# Patient Record
Sex: MALE | Race: WHITE | Employment: FULL TIME | URBAN - METROPOLITAN AREA
[De-identification: names, ages, dates, MRNs, and addresses within clinical notes are randomized per-mention and may not be internally consistent; named-entity substitution may affect disease eponyms.]

---

## 2017-02-22 DIAGNOSIS — T63.441A BEE STING-INDUCED ANAPHYLAXIS: Primary | ICD-10-CM

## 2017-02-22 DIAGNOSIS — T78.2XXA BEE STING-INDUCED ANAPHYLAXIS: Primary | ICD-10-CM

## 2017-02-22 NOTE — TELEPHONE ENCOUNTER
Epipen 2 Ian      Last Written Prescription Date: 6/17/15  Last Fill Quantity: 2,  # refills: 1   Last Office Visit with G, P or [facility] prescribing provider: 2/1/16

## 2017-02-23 RX ORDER — EPINEPHRINE 0.3 MG/.3ML
0.3 INJECTION SUBCUTANEOUS
Qty: 0.3 ML | Refills: 1 | Status: SHIPPED | OUTPATIENT
Start: 2017-02-23 | End: 2020-05-14

## 2019-05-14 ENCOUNTER — HOSPITAL ENCOUNTER (EMERGENCY)
Facility: CLINIC | Age: 39
Discharge: HOME | End: 2019-05-14
Attending: PHYSICIAN ASSISTANT | Admitting: PHYSICIAN ASSISTANT
Payer: COMMERCIAL

## 2019-05-14 VITALS
SYSTOLIC BLOOD PRESSURE: 123 MMHG | TEMPERATURE: 98.1 F | HEIGHT: 71 IN | WEIGHT: 207 LBS | RESPIRATION RATE: 16 BRPM | DIASTOLIC BLOOD PRESSURE: 76 MMHG | BODY MASS INDEX: 28.98 KG/M2 | OXYGEN SATURATION: 96 %

## 2019-05-14 DIAGNOSIS — J06.9 URI WITH COUGH AND CONGESTION: ICD-10-CM

## 2019-05-14 DIAGNOSIS — Z87.891 HISTORY OF TOBACCO USE: ICD-10-CM

## 2019-05-14 PROCEDURE — G0463 HOSPITAL OUTPT CLINIC VISIT: HCPCS | Performed by: PHYSICIAN ASSISTANT

## 2019-05-14 PROCEDURE — 99214 OFFICE O/P EST MOD 30 MIN: CPT | Mod: Z6 | Performed by: PHYSICIAN ASSISTANT

## 2019-05-14 RX ORDER — AZITHROMYCIN 250 MG/1
TABLET, FILM COATED ORAL
Qty: 6 TABLET | Refills: 0 | Status: SHIPPED | OUTPATIENT
Start: 2019-05-14 | End: 2019-10-30

## 2019-05-14 RX ORDER — FLUTICASONE PROPIONATE 50 MCG
1 SPRAY, SUSPENSION (ML) NASAL DAILY
Qty: 16 G | Refills: 0 | Status: SHIPPED | OUTPATIENT
Start: 2019-05-14

## 2019-05-14 RX ORDER — ALBUTEROL SULFATE 90 UG/1
1-2 AEROSOL, METERED RESPIRATORY (INHALATION) EVERY 4 HOURS PRN
Qty: 18 G | Refills: 0 | Status: SHIPPED | OUTPATIENT
Start: 2019-05-14

## 2019-05-14 ASSESSMENT — ENCOUNTER SYMPTOMS
LIGHT-HEADEDNESS: 1
SORE THROAT: 1
DIARRHEA: 0
RHINORRHEA: 1
TROUBLE SWALLOWING: 0
VOICE CHANGE: 0
ABDOMINAL PAIN: 0
WEAKNESS: 1
PALPITATIONS: 0
VOMITING: 0
WHEEZING: 1
FEVER: 0
NAUSEA: 0
COUGH: 1
HEADACHES: 0

## 2019-05-14 ASSESSMENT — MIFFLIN-ST. JEOR: SCORE: 1881.08

## 2019-05-14 NOTE — ED PROVIDER NOTES
History     Chief Complaint   Patient presents with     URI     HPI  Benjamin Spedevick is a 38 year old male who presents with     ENT Symptoms             Symptoms: cc Present Absent Comment   Fever/Chills   x    Fatigue  x  Slightly weak and light headedness on and off    Muscle Aches   x    Eye Irritation   x    Sneezing   x    Nasal Juanjose/Drg  x     Sinus Pressure/Pain   x    Loss of smell   x    Dental pain   x    Sore Throat  x  Hoarse voice on and off    Swollen Glands   x    Ear Pain/Fullness   x    Cough x   Productive and green    Wheeze  x  Occasionally    Chest Pain   x    Shortness of breath   x    Rash   x    Other    x Headache, abdominal pain     Symptom duration:  7-8 days    Symptom severity:  mild and improving some, but cough persisting.    Treatments tried:  over the counter cough medications and mucinex   Contacts:  none known.      Patient states he had several year history of tobacco use, but quit in September. Patient denies COPD, emphysema, cardiac issues, asthma, or diabetes.     Problem list, Medication list, Allergies, and Medical/Social/Surgical histories reviewed in EPIC and updated as appropriate.      Allergies:  Allergies   Allergen Reactions     Bees Anaphylaxis       Problem List:    Patient Active Problem List    Diagnosis Date Noted     Tobacco abuse 06/26/2012     Priority: Medium     CARDIOVASCULAR SCREENING; LDL GOAL LESS THAN 160 10/31/2010     Priority: Medium     Bee sting-induced anaphylaxis 04/13/2010     Priority: Medium     Dental abscess 04/13/2010     Priority: Medium     Other acne 12/21/2007     Priority: Medium     Dermatophytosis of body 08/04/2005     Priority: Medium     Problem list name updated by automated process. Provider to review          Past Medical History:    No past medical history on file.    Past Surgical History:    No past surgical history on file.    Family History:    Family History   Problem Relation Age of Onset     Alcohol/Drug Father   "      Social History:  Marital Status:   [2]  Social History     Tobacco Use     Smoking status: Former Smoker     Packs/day: 0.30     Years: 0.50     Pack years: 0.15     Types: Cigarettes     Last attempt to quit: 2008     Years since quittin.3     Smokeless tobacco: Never Used     Tobacco comment: chewed for about 5 years, age [age]   Substance Use Topics     Alcohol use: Yes     Comment: beer social     Drug use: No     Comment: meth abuse age [age], cocaine, marijuana        Medications:      albuterol (PROAIR HFA/PROVENTIL HFA/VENTOLIN HFA) 108 (90 Base) MCG/ACT inhaler   azithromycin (ZITHROMAX Z-ZULEYMA) 250 MG tablet   fluticasone (FLONASE) 50 MCG/ACT nasal spray   cyclobenzaprine (FLEXERIL) 10 MG tablet   EPINEPHrine (EPIPEN 2-ZULEYMA) 0.3 MG/0.3ML injection   EPINEPHrine (EPIPEN) 0.3 MG/0.3ML injection   EPINEPHrine (EPIPEN) 0.3 MG/0.3ML injection   naproxen (NAPROSYN) 500 MG tablet   zolpidem (AMBIEN) 5 MG tablet         Review of Systems   Constitutional: Negative for fever.   HENT: Positive for congestion, postnasal drip, rhinorrhea and sore throat. Negative for ear discharge, ear pain, trouble swallowing and voice change.    Respiratory: Positive for cough and wheezing.    Cardiovascular: Negative for chest pain and palpitations.   Gastrointestinal: Negative for abdominal pain, diarrhea, nausea and vomiting.   Neurological: Positive for weakness and light-headedness. Negative for headaches.   All other systems reviewed and are negative.      Physical Exam   BP: 123/76  Heart Rate: 74  Temp: 98.1  F (36.7  C)  Resp: 16  Height: 180.3 cm (5' 11\")  Weight: 93.9 kg (207 lb)  SpO2: 96 %      Physical Exam     Constitutional: healthy, alert, no distress, cooperative and smiling   Cardiovascular: RRR. No murmurs, clicks gallops or rub  Respiratory: Lungs clear to auscultation bilaterally. No rales, rhonchi, wheezing appreciated. No accessory muscle use or retractions.   Neck: Neck supple. No " adenopathy. Thyroid symmetric, normal size,  ENT: bilateral TM normal without fluid or infection, neck has bilateral anterior cervical nodes enlarged, throat normal without erythema or exudate, sinuses nontender, post nasal drip noted and nasal mucosa congested  Abdomen: Abdomen soft, non-tender. BS normal. No masses, organomegaly  NEURO: Gait normal. Muscle strength normal bilaterally throughout. Sensation grossly WNL. GCS 15  SKIN: no suspicious lesions or rashes    No results found for this or any previous visit (from the past 24 hour(s)).        ED Course        Procedures              Critical Care time:  none               No results found for this or any previous visit (from the past 24 hour(s)).    Medications - No data to display    Assessments & Plan (with Medical Decision Making)     I have reviewed the nursing notes.    I have reviewed the findings, diagnosis, plan and need for follow up with the patient.   38-year-old male presents the urgent care with 1 week history of URI type symptoms.  Patient states symptoms seem to be improving some, but the cough is persisting and is concerned it may be turning into pneumonia.  See exam findings above.  No indication for chest x-ray at this time, but discussed obtaining one in office today if patient was concerned.  Patient agrees to hold off in office today.  Patient requesting antibiotic prescription as a wait-and-see if symptoms persist or change in any way at least he has a develop.  I informed patient that at this time symptoms appear to be viral in origin and no antibiotic indicated.  Discussed risk of taking antibiotics when it is not necessary including resistance to these antibiotics in the future.  Patient is aware and will hold off on the antibiotic if he does not need it, but would still like the prescription.  Patient given prescription for albuterol inhaler and Flonase nasal spray to start today as needed for wheezing, persistent cough, shortness of  breath.  Flonase also will help with the postnasal drainage and cough.  Patient to follow-up with primary care doctor in 1 to 2 weeks for recheck.  Patient return sooner if symptoms worsen or change these were discussed with patient and given on discharge paperwork.  No concerns at this time for cardiac or PE concerns. Patient discharged in stable condition.       Medication List      Started    albuterol 108 (90 Base) MCG/ACT inhaler  Commonly known as:  PROAIR HFA/PROVENTIL HFA/VENTOLIN HFA  1-2 puffs, Inhalation, EVERY 4 HOURS PRN     azithromycin 250 MG tablet  Commonly known as:  ZITHROMAX Z-ZULEYMA  Two tablets on the first day, then one tablet daily for the next 4 days     fluticasone 50 MCG/ACT nasal spray  Commonly known as:  FLONASE  1 spray, Both Nostrils, DAILY, For 1 -2 weeks then as needed.            Final diagnoses:   URI with cough and congestion   History of tobacco use - quit in September 5/14/2019   [facility] EMERGENCY DEPARTMENT    Deborah Bourgeois PA-C  05/14/19 [id number]

## 2019-05-14 NOTE — DISCHARGE INSTRUCTIONS
Use Medication as directed; fill flonase and use as directed.  Patient can use albuterol inhaler as needed if wheezing or shortness of breath occurs.  Patient given prescription Z-Ian as a wait-and-see.  Discussed with patient at this time symptoms appear to be viral in origin and no antibiotic indicated, but if symptoms persist, worsen, or change or fail to improve in the next 7 days patient can fill Z-Ian.    Patient advised to call for any test results (if obtained during visit) within 2-3 days.     Hydrate with fluids, rest, cool humidifier.  May use acetaminophen, ibuprofen prn.    For your Cough   The Buckwheat Honey Dose: Given   hour Prior to Bedtime  For children age under 1 year -Do not use due to botulism risk    2-5 years -  tsp (2.5 mL)   6-11 years -1 tsp (5 mL)   12-18 years -2 tsp (10 mL)     Guaifenesin    Adult dose -Not to exceed 2.4 g (2400mg) per day   Child age 6-12 years -100 mg every 4 hr, not to exceed 1.2 g (1200mg) per day    Pediatric, 2-6 years -50 mg every 4 hr as needed, not to exceed 600 mg per day    Cough medications is not recommended in children under 2 years.  With use of cough medications have combination medications be aware of products in the cough medication you are using to avoid overdose    Follow up with PCP in 1-2 weeks.    Go to Emergency Room if sx worsen or change, Shortness of breath, chest pain, persistent fevers, or painful breathing occur.     Patient voiced understanding of instructions given.

## 2019-05-14 NOTE — ED AVS SNAPSHOT
[facility] Emergency Department  [address] [address]  Phone:  [phone]  Fax:  [phone]                                    Benjamin Spedevick   MRN: [id number]    Department:  [facility] Emergency Department   Date of Visit:  5/14/2019           After Visit Summary Signature Page    I have received my discharge instructions, and my questions have been answered. I have discussed any challenges I see with this plan with the nurse or doctor.    ..........................................................................................................................................  Patient/Patient Representative Signature      ..........................................................................................................................................  Patient Representative Print Name and Relationship to Patient    ..................................................               ................................................  Date                                   Time    ..........................................................................................................................................  Reviewed by Signature/Title    ...................................................              ..............................................  Date                                               Time          22EPIC Rev 08/18

## 2019-10-30 ENCOUNTER — OFFICE VISIT (OUTPATIENT)
Dept: FAMILY MEDICINE | Facility: CLINIC | Age: 39
End: 2019-10-30
Payer: COMMERCIAL

## 2019-10-30 VITALS
HEART RATE: 70 BPM | BODY MASS INDEX: 28.17 KG/M2 | HEIGHT: 72 IN | DIASTOLIC BLOOD PRESSURE: 70 MMHG | WEIGHT: 208 LBS | SYSTOLIC BLOOD PRESSURE: 118 MMHG | TEMPERATURE: 97.5 F

## 2019-10-30 DIAGNOSIS — M54.59 MECHANICAL LOW BACK PAIN: ICD-10-CM

## 2019-10-30 DIAGNOSIS — Z00.00 ROUTINE GENERAL MEDICAL EXAMINATION AT A HEALTH CARE FACILITY: Primary | ICD-10-CM

## 2019-10-30 DIAGNOSIS — R40.0 HAS DAYTIME DROWSINESS: ICD-10-CM

## 2019-10-30 DIAGNOSIS — R53.83 OTHER FATIGUE: ICD-10-CM

## 2019-10-30 LAB
ANION GAP SERPL CALCULATED.3IONS-SCNC: 7 MMOL/L (ref 3–14)
BUN SERPL-MCNC: 17 MG/DL (ref 7–30)
CALCIUM SERPL-MCNC: 9 MG/DL (ref 8.5–10.1)
CHLORIDE SERPL-SCNC: 106 MMOL/L (ref 94–109)
CO2 SERPL-SCNC: 28 MMOL/L (ref 20–32)
CREAT SERPL-MCNC: 0.84 MG/DL (ref 0.66–1.25)
GFR SERPL CREATININE-BSD FRML MDRD: >90 ML/MIN/{1.73_M2}
GLUCOSE SERPL-MCNC: 86 MG/DL (ref 70–99)
POTASSIUM SERPL-SCNC: 3.7 MMOL/L (ref 3.4–5.3)
SODIUM SERPL-SCNC: 141 MMOL/L (ref 133–144)

## 2019-10-30 PROCEDURE — 84403 ASSAY OF TOTAL TESTOSTERONE: CPT | Performed by: NURSE PRACTITIONER

## 2019-10-30 PROCEDURE — 99213 OFFICE O/P EST LOW 20 MIN: CPT | Mod: 25 | Performed by: NURSE PRACTITIONER

## 2019-10-30 PROCEDURE — 84270 ASSAY OF SEX HORMONE GLOBUL: CPT | Performed by: NURSE PRACTITIONER

## 2019-10-30 PROCEDURE — 36415 COLL VENOUS BLD VENIPUNCTURE: CPT | Performed by: NURSE PRACTITIONER

## 2019-10-30 PROCEDURE — 90471 IMMUNIZATION ADMIN: CPT | Performed by: NURSE PRACTITIONER

## 2019-10-30 PROCEDURE — 90472 IMMUNIZATION ADMIN EACH ADD: CPT | Performed by: NURSE PRACTITIONER

## 2019-10-30 PROCEDURE — 80048 BASIC METABOLIC PNL TOTAL CA: CPT | Performed by: NURSE PRACTITIONER

## 2019-10-30 PROCEDURE — 99385 PREV VISIT NEW AGE 18-39: CPT | Mod: 25 | Performed by: NURSE PRACTITIONER

## 2019-10-30 PROCEDURE — 90632 HEPA VACCINE ADULT IM: CPT | Performed by: NURSE PRACTITIONER

## 2019-10-30 PROCEDURE — 90714 TD VACC NO PRESV 7 YRS+ IM: CPT | Performed by: NURSE PRACTITIONER

## 2019-10-30 RX ORDER — CYCLOBENZAPRINE HCL 10 MG
10 TABLET ORAL 3 TIMES DAILY PRN
Qty: 90 TABLET | Refills: 1 | Status: SHIPPED | OUTPATIENT
Start: 2019-10-30

## 2019-10-30 RX ORDER — NAPROXEN 500 MG/1
500 TABLET ORAL 2 TIMES DAILY WITH MEALS
Qty: 60 TABLET | Refills: 1 | Status: SHIPPED | OUTPATIENT
Start: 2019-10-30

## 2019-10-30 ASSESSMENT — ENCOUNTER SYMPTOMS
ABDOMINAL PAIN: 0
COUGH: 0
NERVOUS/ANXIOUS: 0
HEMATURIA: 0
DIZZINESS: 0
CHILLS: 0
CONSTIPATION: 0
DIARRHEA: 0
FEVER: 0
HEMATOCHEZIA: 0
EYE PAIN: 0

## 2019-10-30 ASSESSMENT — MIFFLIN-ST. JEOR: SCORE: 1901.48

## 2019-10-30 NOTE — LETTER
November 6, 2019      Umang Dickeydevick  [address] [address]        Dear Mr.Spedevick,    We are writing to inform you of your test results.    Your testosterone levels are within the normal limits.     Component      Latest Ref Rng & Units 10/30/2019   Testosterone Total      240 - 950 ng/dL 575   Sex Hormone Binding Globulin      11 - 80 nmol/L 50   Free Testosterone Calculated      4.7 - 24.4 ng/dL 9.39       If you have any questions or concerns, please call the clinic at the number listed above.       Sincerely,        KYLIE Parr CNP

## 2019-10-30 NOTE — PROGRESS NOTES
SUBJECTIVE:   CC: Benjamin Spedevick is an 38 year old male who presents for preventative health visit.     Healthy Habits:     Getting at least 3 servings of Calcium per day:  NO    Bi-annual eye exam:  NO    Dental care twice a year:  Yes    Sleep apnea or symptoms of sleep apnea:  Daytime drowsiness and Sleep apnea    Diet:  Regular (no restrictions)    Frequency of exercise:  1 day/week    Duration of exercise:  N/A    Taking medications regularly:  Yes    Medication side effects:  None    PHQ-2 Total Score: 0    Additional concerns today:  No          Today's PHQ-2 Score:   PHQ-2 (  [employer]) 10/30/2019   Q1: Little interest or pleasure in doing things 0   Q2: Feeling down, depressed or hopeless 0   PHQ-2 Score 0   Q1: Little interest or pleasure in doing things Not at all   Q2: Feeling down, depressed or hopeless Not at all   PHQ-2 Score 0       Abuse: Current or Past(Physical, Sexual or Emotional)- No  Do you feel safe in your environment? Yes        Social History     Tobacco Use     Smoking status: Former Smoker     Packs/day: 0.30     Years: 0.50     Pack years: 0.15     Types: Cigarettes     Last attempt to quit: 2008     Years since quittin.8     Smokeless tobacco: Never Used     Tobacco comment: chewed for about 5 years, age [age]   Substance Use Topics     Alcohol use: Yes     Comment: beer social     If you drink alcohol do you typically have >3 drinks per day or >7 drinks per week? Yes      Alcohol Use 10/30/2019   Prescreen: >3 drinks/day or >7 drinks/week? No       Last PSA: No results found for: PSA    Reviewed orders with patient. Reviewed health maintenance and updated orders accordingly - Yes  Labs reviewed in EPIC  BP Readings from Last 3 Encounters:   10/30/19 118/70   19 123/76   16 123/80    Wt Readings from Last 3 Encounters:   10/30/19 94.3 kg (208 lb)   19 93.9 kg (207 lb)   16 97.4 kg (214 lb 12.8 oz)                  Patient Active Problem List    Diagnosis     Dermatophytosis of body     Other acne     Bee sting-induced anaphylaxis     Dental abscess     CARDIOVASCULAR SCREENING; LDL GOAL LESS THAN 160     Tobacco abuse     No past surgical history on file.    Social History     Tobacco Use     Smoking status: Former Smoker     Packs/day: 0.30     Years: 0.50     Pack years: 0.15     Types: Cigarettes     Last attempt to quit: 2008     Years since quittin.8     Smokeless tobacco: Never Used     Tobacco comment: chewed for about 5 years, age [age]   Substance Use Topics     Alcohol use: Yes     Comment: beer social     Family History   Problem Relation Age of Onset     Alcohol/Drug Father          Current Outpatient Medications   Medication Sig Dispense Refill     albuterol (PROAIR HFA/PROVENTIL HFA/VENTOLIN HFA) 108 (90 Base) MCG/ACT inhaler Inhale 1-2 puffs into the lungs every 4 hours as needed for shortness of breath / dyspnea or wheezing 18 g 0     cyclobenzaprine (FLEXERIL) 10 MG tablet Take 1 tablet (10 mg) by mouth 3 times daily as needed for muscle spasms 90 tablet 3     EPINEPHrine (EPIPEN 2-ZULEYMA) 0.3 MG/0.3ML injection Inject 0.3 mLs (0.3 mg) into the muscle once as needed for anaphylaxis DUE FOR APPOINTMENT 2017. NO FURTHER REFILLS 0.3 mL 1     fluticasone (FLONASE) 50 MCG/ACT nasal spray Spray 1 spray into both nostrils daily For 1 -2 weeks then as needed. 16 g 0     naproxen (NAPROSYN) 500 MG tablet Take 1 tablet (500 mg) by mouth 2 times daily (with meals) 60 tablet 6     zolpidem (AMBIEN) 5 MG tablet Take 1 tablet (5 mg) by mouth nightly as needed for sleep 30 tablet 0     Allergies   Allergen Reactions     Bees Anaphylaxis     No lab results found.     Reviewed and updated as needed this visit by clinical staff         Reviewed and updated as needed this visit by Provider        No past medical history on file.   No past surgical history on file.  OB History   No obstetric history on file.       Review of Systems    Constitutional: Negative for chills and fever.   HENT: Negative for congestion and ear pain.    Eyes: Negative for pain.   Respiratory: Negative for cough.    Cardiovascular: Negative for chest pain.   Gastrointestinal: Negative for abdominal pain, constipation, diarrhea and hematochezia.   Genitourinary: Negative for hematuria.   Neurological: Negative for dizziness.   Psychiatric/Behavioral: The patient is not nervous/anxious.        OBJECTIVE:   /70   Pulse 70   Temp 97.5  F (36.4  C) (Tympanic)   Ht 1.829 m (6')   Wt 94.3 kg (208 lb)   BMI 28.21 kg/m      Physical Exam  GENERAL: healthy, alert and no distress  EYES: Eyes grossly normal to inspection, PERRL and conjunctivae and sclerae normal  HENT: ear canals and TM's normal, nose and mouth without ulcers or lesions  NECK: no adenopathy, no asymmetry, masses, or scars and thyroid normal to palpation  RESP: lungs clear to auscultation - no rales, rhonchi or wheezes  CV: regular rate and rhythm, normal S1 S2, no S3 or S4, no murmur, click or rub, no peripheral edema and peripheral pulses strong  ABDOMEN: soft, nontender, no hepatosplenomegaly, no masses and bowel sounds normal  MS: no gross musculoskeletal defects noted, no edema  SKIN: no suspicious lesions or rashes  NEURO: Normal strength and tone, mentation intact and speech normal  PSYCH: mentation appears normal, affect normal/bright        ASSESSMENT/PLAN:   (Z00.00) Routine general medical examination at a health care facility  (primary encounter diagnosis)  Comment:   Plan: Basic metabolic panel           (M54.5) Mechanical low back pain  Comment:l patient has chronic lower back pain is controlled with Flexeril renewed today  Plan: cyclobenzaprine (FLEXERIL) 10 MG tablet,         naproxen (NAPROSYN) 500 MG tablet, OFFICE/OUTPT        VISIT,EST,LEVL III    (R53.83) Other fatigue  Comment: Patient concerned about fatigue and testosterone level did obtain today  Plan: **Testosterone Free and  "Total FUTURE anytime,         OFFICE/OUTPT VISIT,EST,LEVL III      (R40.0) Has daytime drowsiness  Comment:Patient has noticed increased daytime drowsiness concern for undiagnosed sleep apnea versus narcolepsy we will have him perform sleep test  Plan: SLEEP EVALUATION & MANAGEMENT REFERRAL - ADULT         -[facility]  [phone]        (Age 2 and up), OFFICE/OUTPT VISIT,EST,LEVL III        COUNSELING:   Reviewed preventive health counseling, as reflected in patient instructions       Regular exercise       Healthy diet/nutrition       Vision screening       Hearing screening       Alcohol Use    Estimated body mass index is 28.87 kg/m  as calculated from the following:    Height as of 5/14/19: 1.803 m (5' 11\").    Weight as of 5/14/19: 93.9 kg (207 lb).          reports that he quit smoking about 11 years ago. His smoking use included cigarettes. He has a 0.15 pack-year smoking history. He has never used smokeless tobacco.      Counseling Resources:  ATP IV Guidelines  Pooled Cohorts Equation Calculator  FRAX Risk Assessment  ICSI Preventive Guidelines  Dietary Guidelines for Americans, 2010  USDA's MyPlate  ASA Prophylaxis  Lung CA Screening    KYLIE Parr CNP  [facility]  "

## 2019-10-30 NOTE — NURSING NOTE
Chief Complaint   Patient presents with     Physical       Initial /70   Pulse 70   Temp 97.5  F (36.4  C) (Tympanic)   Ht 1.829 m (6')   Wt 94.3 kg (208 lb)   BMI 28.21 kg/m   Estimated body mass index is 28.21 kg/m  as calculated from the following:    Height as of this encounter: 1.829 m (6').    Weight as of this encounter: 94.3 kg (208 lb).    Patient presents to the clinic using     Health Maintenance that is potentially due pending provider review:          Is there anyone who you would like to be able to receive your results? If yes have patient fill out EDIN

## 2019-10-31 DIAGNOSIS — F51.01 PRIMARY INSOMNIA: ICD-10-CM

## 2019-10-31 RX ORDER — ZOLPIDEM TARTRATE 5 MG/1
5 TABLET ORAL
Qty: 30 TABLET | Refills: 0 | Status: SHIPPED | OUTPATIENT
Start: 2019-10-31 | End: 2019-12-12

## 2019-10-31 NOTE — TELEPHONE ENCOUNTER
PHARMACY SAYS PATIENT WAS EXPECTING A NEW ORDER FOR AMBIEN TO BE SENT TO PHARMACY. PLEASE VERIFY AND RE-SEND    Requested Prescriptions   Pending Prescriptions Disp Refills     zolpidem (AMBIEN) 5 MG tablet 30 tablet 0     Sig: Take 1 tablet (5 mg) by mouth nightly as needed for sleep       There is no refill protocol information for this order            Last Written Prescription Date:  12/15/16  Last Fill Quantity: 30,   # refills: 0  Last Office Visit: YESTERDAY WITH LUCIEN SHELTON  Future Office visit:       Routing refill request to provider for review/approval because:  Drug not on the [facility], P or [facility] refill protocol or controlled substance

## 2019-11-04 NOTE — RESULT ENCOUNTER NOTE
Please Notify Umang  of test results basic metabolic panel within normal limits.  Testosterone level still pending     Negra Nielson CNP

## 2019-11-05 LAB
SHBG SERPL-SCNC: 50 NMOL/L (ref 11–80)
TESTOST FREE SERPL-MCNC: 9.39 NG/DL (ref 4.7–24.4)
TESTOST SERPL-MCNC: 575 NG/DL (ref 240–950)

## 2019-11-05 NOTE — RESULT ENCOUNTER NOTE
Please Notify Umang  of test results *testosterone level within normal limits.    Negra Nielson CNP

## 2019-12-11 DIAGNOSIS — F51.01 PRIMARY INSOMNIA: ICD-10-CM

## 2019-12-11 NOTE — TELEPHONE ENCOUNTER
Instructions         Return in about 1 year (around 10/30/2020) for Physical Exam.      Preventive Health Recommendations  Male Ages 26 - 39

## 2019-12-11 NOTE — TELEPHONE ENCOUNTER
Requested Prescriptions   Pending Prescriptions Disp Refills     zolpidem (AMBIEN) 5 MG tablet 30 tablet 0     Sig: Take 1 tablet (5 mg) by mouth nightly as needed for sleep       There is no refill protocol information for this order            Last Written Prescription Date:  10/31/19  Last Fill Quantity: 30,   # refills: 0  Last Office Visit: 10/30/19  Arthur  Future Office visit:       Routing refill request to provider for review/approval because:  Drug not on the [facility] or [facility] refill protocol or controlled substance

## 2019-12-12 RX ORDER — ZOLPIDEM TARTRATE 5 MG/1
5 TABLET ORAL
Qty: 30 TABLET | Refills: 0 | Status: SHIPPED | OUTPATIENT
Start: 2019-12-12 | End: 2020-01-13

## 2020-01-13 DIAGNOSIS — F51.01 PRIMARY INSOMNIA: ICD-10-CM

## 2020-01-13 RX ORDER — ZOLPIDEM TARTRATE 5 MG/1
5 TABLET ORAL
Qty: 30 TABLET | Refills: 0 | Status: SHIPPED | OUTPATIENT
Start: 2020-01-13 | End: 2020-03-09

## 2020-01-13 NOTE — TELEPHONE ENCOUNTER
Routing refill request to provider for review/approval because:  Drug not on the [facility] refill protocol      10/30/19 OV: 'Return in about 1 year (around 10/30/2020) for Physical Exam.'    Rebecca KELLY RN

## 2020-01-13 NOTE — TELEPHONE ENCOUNTER
Requested Prescriptions   Pending Prescriptions Disp Refills     zolpidem (AMBIEN) 5 MG tablet 30 tablet 0     Sig: Take 1 tablet (5 mg) by mouth nightly as needed for sleep       There is no refill protocol information for this order        Last Written Prescription Date:  12/12/19  Last Fill Quantity: 30,  # refills: 0   Last office visit: 10/30/2019 with prescribing provider:  Nisreen Santana Office Visit:     Ale Bowen

## 2020-03-06 DIAGNOSIS — F51.01 PRIMARY INSOMNIA: ICD-10-CM

## 2020-03-07 NOTE — TELEPHONE ENCOUNTER
ZOLPIDEM TARTRATE 5 MG TABLET  Last Written Prescription Date:  1/13/20  Last Fill Quantity: 30,  # refills: 0   Last office visit: 10/30/2019 with prescribing provider:  shawanda   Future Office Visit:    Requested Prescriptions   Pending Prescriptions Disp Refills     zolpidem (AMBIEN) 5 MG tablet [Pharmacy Med Name: ZOLPIDEM TARTRATE 5 MG TABLET] 30 tablet 0     Sig: TAKE 1 TABLET (5 MG) BY MOUTH NIGHTLY AS NEEDED FOR SLEEP       There is no refill protocol information for this order

## 2020-03-09 RX ORDER — ZOLPIDEM TARTRATE 5 MG/1
5 TABLET ORAL
Qty: 30 TABLET | Refills: 0 | Status: SHIPPED | OUTPATIENT
Start: 2020-03-09 | End: 2020-04-10

## 2020-04-09 DIAGNOSIS — F51.01 PRIMARY INSOMNIA: ICD-10-CM

## 2020-04-10 RX ORDER — ZOLPIDEM TARTRATE 5 MG/1
5 TABLET ORAL
Qty: 30 TABLET | Refills: 0 | Status: SHIPPED | OUTPATIENT
Start: 2020-04-10 | End: 2020-05-12

## 2020-04-10 NOTE — TELEPHONE ENCOUNTER
Routing refill request to provider for review/approval because:  Drug not on the [facility] refill protocol     Requested Prescriptions   Pending Prescriptions Disp Refills     zolpidem (AMBIEN) 5 MG tablet [Pharmacy Med Name: ZOLPIDEM TARTRATE 5 MG TABLET] 30 tablet 0     Sig: TAKE 1 TABLET (5 MG) BY MOUTH NIGHTLY AS NEEDED FOR SLEEP       There is no refill protocol information for this order        Last Written Prescription Date:  3/9/2020  Last Fill Quantity: 30,  # refills: 0   Last office visit: 10/30/2019 with prescribing provider:  Nisreen ESPINAL   Future Office Visit:      Rebecca KELLY RN, BSN

## 2020-05-09 DIAGNOSIS — F51.01 PRIMARY INSOMNIA: ICD-10-CM

## 2020-05-11 NOTE — TELEPHONE ENCOUNTER
Last Written Prescription Date:  4/10/20  Last Fill Quantity: 30,   # refills: 0  Last Office Visit: 10/30/19  Future Office visit:       Routing refill request to provider for review/approval because:  Drug not on the [facility], P or [facility] refill protocol or controlled substance

## 2020-05-12 RX ORDER — ZOLPIDEM TARTRATE 5 MG/1
5 TABLET ORAL
Qty: 30 TABLET | Refills: 0 | Status: SHIPPED | OUTPATIENT
Start: 2020-05-12 | End: 2020-06-10

## 2020-06-09 DIAGNOSIS — F51.01 PRIMARY INSOMNIA: ICD-10-CM

## 2020-06-10 RX ORDER — ZOLPIDEM TARTRATE 5 MG/1
5 TABLET ORAL
Qty: 30 TABLET | Refills: 0 | Status: SHIPPED | OUTPATIENT
Start: 2020-06-10 | End: 2020-07-10

## 2020-06-10 NOTE — TELEPHONE ENCOUNTER
[place] reviewed:  reviewed- no concerns    [place]:  [email or web address]    05/12/2020  1   05/12/2020  Zolpidem Tartrate 5 Mg Tablet  30.00 30 Ka Danielle  [id number]  Gra ([id number])  0/[place] 0.25 [place]   04/10/2020  1   04/10/2020  Zolpidem Tartrate 5 Mg Tablet  30.00 30 Ka Danielle  [id number]  Gra ([id number])  [place]   03/09/2020  1   03/09/2020  Zolpidem Tartrate 5 Mg Tablet  30.00 30 Ka Danielle  [id number]  Gra ([id number])  0[place]   01/13/2020  1   01/13/2020  Zolpidem Tartrate 5 Mg Tablet  30.00 30 Ka Danielle  [id number]  Gra ([id number])  0[place]   12/12/2019  1   12/12/2019  Zolpidem Tartrate 5 Mg Tablet  30.00 30 Ka Danielle  [id number]  Gra ([id number])  0[place]   11/30/2019  1   10/31/2019  Zolpidem Tartrate 5 Mg Tablet  10.00 10 Ka Danielle  [id number]  Gra ([id number])  2/[place] 0.[place]   11/15/2019  1   10/31/2019  Zolpidem Tartrate 5 Mg Tablet  10.00 10 Ka Danielle  [id number]  Gra ([id number])  1/[place] 0.[place]   10/31/2019  1   10/31/2019  Zolpidem Tartrate 5 Mg Tablet  10.00 10 Ka Danielle  [id number]  Gra ([id number])  0/[place] 0.[place]     Last office visit with Negra Nielson 10/30/2020

## 2020-07-09 DIAGNOSIS — F51.01 PRIMARY INSOMNIA: ICD-10-CM

## 2020-07-10 RX ORDER — ZOLPIDEM TARTRATE 5 MG/1
5 TABLET ORAL
Qty: 30 TABLET | Refills: 0 | Status: SHIPPED | OUTPATIENT
Start: 2020-07-10

## 2020-07-10 NOTE — TELEPHONE ENCOUNTER
Routing refill request to provider for review/approval because:  Drug not on the G refill protocol     Requested Prescriptions   Pending Prescriptions Disp Refills     zolpidem (AMBIEN) 5 MG tablet [Pharmacy Med Name: ZOLPIDEM TARTRATE 5 MG TABLET] 30 tablet 0     Sig: TAKE 1 TABLET (5 MG) BY MOUTH NIGHTLY AS NEEDED FOR SLEEP       There is no refill protocol information for this order

## 2020-08-09 ENCOUNTER — TELEPHONE (OUTPATIENT)
Dept: FAMILY MEDICINE | Facility: CLINIC | Age: 40
End: 2020-08-09

## 2020-08-09 DIAGNOSIS — F51.01 PRIMARY INSOMNIA: ICD-10-CM

## 2020-08-11 RX ORDER — ZOLPIDEM TARTRATE 5 MG/1
5 TABLET ORAL
Qty: 30 TABLET | Refills: 0 | OUTPATIENT
Start: 2020-08-11

## 2020-08-11 NOTE — TELEPHONE ENCOUNTER
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  [facility]

## 2021-09-23 ENCOUNTER — VIRTUAL VISIT (OUTPATIENT)
Dept: FAMILY MEDICINE | Facility: CLINIC | Age: 41
End: 2021-09-23
Payer: COMMERCIAL

## 2021-09-23 DIAGNOSIS — Z20.822 SUSPECTED 2019 NOVEL CORONAVIRUS INFECTION: Primary | ICD-10-CM

## 2021-09-23 PROCEDURE — 99213 OFFICE O/P EST LOW 20 MIN: CPT | Mod: 95 | Performed by: NURSE PRACTITIONER

## 2021-09-23 NOTE — PROGRESS NOTES
Umang is a 40 year old who is being evaluated via a billable telephone visit.      What phone number would you like to be contacted at? [phone]  How would you like to obtain your AVS? Mail a copy    Assessment & Plan     (I91.061) Suspected 2019 novel coronavirus infection  (primary encounter diagnosis)  Comment:   Plan: Symptomatic COVID-19 Virus (Coronavirus) by PCR        Nasopharyngeal                No follow-ups on file.    Negra Nielson, KYLIE CNP  [facility]    Subjective   Umang is a 40 year old who presents for the following health issues     HPI       Concern for COVID-19  About how many days ago did these symptoms start? 4 days  Is this your first visit for this illness? Yes  In the 14 days before your symptoms started, have you had close contact with someone with COVID-19 (Coronavirus)? No  Do you have a fever or chills? Yes, the highest temperature was 100  Are you having new or worsening difficulty breathing? No  Do you have new or worsening cough? Yes, it's a dry cough.   Have you had any new or unexplained body aches? No    Have you experienced any of the following NEW symptoms?    Headache: YES    Sore throat: No    Loss of taste or smell: No    Chest pain: No    Diarrhea: No    Rash: No  What treatments have you tried? ibu  Who do you live with? Wife and daughter  Are you, or a household member, a healthcare worker or a ? No  Do you live in a nursing home, group home, or shelter? No  Do you have a way to get food/medications if quarantined? Yes, I have a friend or family member who can help me.    New Patient/Transfer of Care    Review of Systems   Constitutional, HEENT, cardiovascular, pulmonary, gi and gu systems are negative, except as otherwise noted.      Objective           Vitals:  No vitals were obtained today due to virtual visit.    Physical Exam   healthy, alert and no distress  PSYCH: Alert and oriented times 3; coherent speech,  normal   rate and volume, able to articulate logical thoughts, able   to abstract reason, no tangential thoughts, no hallucinations   or delusions  His affect is normal  RESP: No cough, no audible wheezing, able to talk in full sentences  Remainder of exam unable to be completed due to telephone visits    No results found for any visits on 09/23/21.            Phone call duration: 15 minutes

## 2022-02-25 ENCOUNTER — THERAPY VISIT (OUTPATIENT)
Dept: PHYSICAL THERAPY | Facility: CLINIC | Age: 42
End: 2022-02-25
Payer: COMMERCIAL

## 2022-02-25 DIAGNOSIS — M54.50 ACUTE RIGHT-SIDED LOW BACK PAIN WITHOUT SCIATICA: ICD-10-CM

## 2022-02-25 PROCEDURE — 97140 MANUAL THERAPY 1/> REGIONS: CPT | Mod: GP | Performed by: PHYSICAL THERAPIST

## 2022-02-25 PROCEDURE — 97161 PT EVAL LOW COMPLEX 20 MIN: CPT | Mod: GP | Performed by: PHYSICAL THERAPIST

## 2022-02-25 PROCEDURE — 97110 THERAPEUTIC EXERCISES: CPT | Mod: GP | Performed by: PHYSICAL THERAPIST

## 2022-02-25 NOTE — PROGRESS NOTES
Physical Therapy Initial Evaluation  Subjective:  The history is provided by the patient. No  was used.   Patient Health History  Benjamin Spedevick being seen for low back pain.     Problem began: 2/21/2022.   Problem occurred: slipped on ice   Pain is reported as 5/10 on pain scale.  General health as reported by patient is excellent.  Pertinent medical history includes: none.   Red flags:  None as reported by patient.  Medical allergies: none.   Surgeries include:  None.    Current medications:  None.    Current occupation is , construction.   Primary job tasks include:  Driving, lifting/carrying, operating a machine/assembly, prolonged standing, pushing/pulling and repetitive tasks.                  Therapist Generated HPI Evaluation  Problem details: He was carrying his tools and slipped on the ice.  He did a quick slip of L foot on ice and felt a sharp twinge of pain in his low back.  The pain is worst with getting out of bed in AM.  The pain will increase with prolonged driving, sitting on couch, standing with tall posture.  Feels better with activity..         Type of problem:  Lumbar.    This is a recurrent condition.  Condition occurred with:  A fall/slip.  Where condition occurred: in the community.  Patient reports pain:  Lower lumbar spine and SI joint right.  Pain is described as aching and sharp and is intermittent.  Pain radiates to:  No radiation. Pain is worse in the A.M..  Since onset symptoms are gradually improving.  Associated symptoms:  Loss of motion/stiffness. Symptoms are exacerbated by certain positions, sitting and standing  and relieved by activity/movement.    Previous treatment includes physical therapy. There was significant improvement following previous treatment.  Restrictions due to condition include:  Currently not working due to present treatment.  Barriers include:  Stairs.      Oswestry Score: 24 %                 Objective:  System    Physical  Exam    General     ROS  Benjamin Spedevick , : 1980, MRN: [id number]    Physical Therapy Objective Findings  Subjective information, goals, clinical impression, daily documentation and other information found in EPISODES tab.  Objective:     Lumbar Pain    Posture: increased thoracic kyphosis, decreased lumbar lordosis  Gait:  normal  Lumbar Range of Motion:  Flexion                                                 50%                                                                                                                          Extension 50%   Right Side Bending 75%   Left Side Bending 75%   Repeated extension- standing    Repeated flexion- standing      Pelvic screen:                                                                         Positive                                            Negative                                             Standing Forward Bend X R    Gillet(March) X R    Supine to sit     Sacroiliac provocation test     Pubic symphysis provocation            -resisted hip add at 45     Other:       Hip Screen:                                                                  Positive                                             Negative                                             Hip ROM     Scour     JERRY X R    FADIR     Other:     Manual Muscle Testing (graded 0-5, measured at 0 degrees unless otherwise noted):                                                                              Right                                  Left                                                     Transversus Abdominus     -Demar Leg Lowering (deg)     Hip Flex L2 4 4   Hip Abd 4+ 4+   Hip Add     Hip Ext 4+ 4+   Knee Flex 4 + (+ in low back) 4+ (+ in low back)   Knee Ext L3 5- (+ in low back) 5- (+ in low back)   Ankle Dorsiflexion L4 5 5   Great Toe Extension L5 5 5   Ankle Plantar Flexion S1 5 5   Other:     (+ mild pain, ++ moderate pain, +++ severe pain)    Special Tests:                                                                      Positive                                             Negative                                             Sign of Buttock  x   SLR  x   Rohit Test     Ely Test     Prone instability Test     Crossover SLR     Repeated extension prone     Other:       Flexibility:                                                              Right                                                 Left                                                      Hamstring SLR 70 SLR 70   Hip flexor normal normal   Quadricep normal normal   Carlyn's     piriformis normal normal   Other:            Segmental Mobility: pain PA L4>L5, hypomobile L5    Palpation: R PSIS sup, R ASIS inf, L sacral sulcus deeper, R ANNE inf, L FRS L5, tender around R PSIS    -If symptoms past hip, must do neuro testing  Dermatome/Sensory  Testing: normal to light touch BLE  Reflex Testing:                                                                 Right                                                  Left                                                     Patellar Tendon normal normal   Achilles Tendon normal normal   Babinski         Assessment/Plan:    Patient is a 41 year old male with lumbar complaints.    Patient has the following significant findings with corresponding treatment plan.                Diagnosis 1:  Low back pain consistent with L4-L5 disc irritation with R SI dysfunction    Pain -  hot/cold therapy, manual therapy, self management, education, directional preference exercise and home program  Decreased ROM/flexibility - manual therapy, therapeutic exercise, therapeutic activity and home program  Decreased joint mobility - manual therapy, therapeutic exercise, therapeutic activity and home program  Decreased strength - therapeutic exercise, therapeutic activities and home program  Decreased function - therapeutic activities and home program    Therapy Evaluation Codes:   1) History comprised  of:   Personal factors that impact the plan of care:      Profession.    Comorbidity factors that impact the plan of care are:      None.     Medications impacting care: None.  2) Examination of Body Systems comprised of:   Body structures and functions that impact the plan of care:      Lumbar spine.   Activity limitations that impact the plan of care are:      Bending, Lifting, Sitting and Working.  3) Clinical presentation characteristics are:   Stable/Uncomplicated.  4) Decision-Making    Low complexity using standardized patient assessment instrument and/or measureable assessment of functional outcome.  Cumulative Therapy Evaluation is: Low complexity.    Previous and current functional limitations:  (See Goal Flow Sheet for this information)    Short term and Long term goals: (See Goal Flow Sheet for this information)     Communication ability:  Patient appears to be able to clearly communicate and understand verbal and written communication and follow directions correctly.  Treatment Explanation - The following has been discussed with the patient:   RX ordered/plan of care  Anticipated outcomes  Possible risks and side effects  This patient would benefit from PT intervention to resume normal activities.   Rehab potential is good.    Frequency:  1 X/2 weeks, once daily  Duration:  for 6 weeks  Discharge Plan:  Achieve all LTG.  Independent in home treatment program.  Reach maximal therapeutic benefit.    Please refer to the daily flowsheet for treatment today, total treatment time and time spent performing 1:1 timed codes.     Alfred Edmondson,PT, DPT, OCS

## 2022-04-12 PROBLEM — M54.50 ACUTE RIGHT-SIDED LOW BACK PAIN WITHOUT SCIATICA: Status: RESOLVED | Noted: 2022-02-25 | Resolved: 2022-04-12

## 2022-04-12 NOTE — PROGRESS NOTES
Patient seen for one time evaluation and treatment.  Patient did not return for further treatment and current status is unknown.  Please see initial evaluation for further information.    Alfred Edmondson,PT, DPT, OCS

## 2022-05-20 ENCOUNTER — MYC REFILL (OUTPATIENT)
Dept: FAMILY MEDICINE | Facility: CLINIC | Age: 42
End: 2022-05-20
Payer: COMMERCIAL

## 2022-05-20 DIAGNOSIS — F51.01 PRIMARY INSOMNIA: ICD-10-CM

## 2022-05-23 RX ORDER — ZOLPIDEM TARTRATE 5 MG/1
5 TABLET ORAL
Qty: 30 TABLET | Refills: 0 | OUTPATIENT
Start: 2022-05-23

## 2022-05-23 NOTE — TELEPHONE ENCOUNTER
Message given to patient with good understanding.  Sylvia Fitzgerald PSC on 5/23/2022 at 5:56 PM

## 2022-05-23 NOTE — TELEPHONE ENCOUNTER
Requested Prescriptions   Pending Prescriptions Disp Refills     zolpidem (AMBIEN) 5 MG tablet 30 tablet 0     Sig: Take 1 tablet (5 mg) by mouth nightly as needed for sleep       There is no refill protocol information for this order        Last Written Prescription Date:  7/10/20  Last Fill Quantity: 30,  # refills: 0   Last office visit: Visit date not found with prescribing provider:     Future Office Visit:

## 2022-06-25 ENCOUNTER — HEALTH MAINTENANCE LETTER (OUTPATIENT)
Age: 42
End: 2022-06-25

## 2022-11-19 ENCOUNTER — HEALTH MAINTENANCE LETTER (OUTPATIENT)
Age: 42
End: 2022-11-19

## 2023-07-02 ENCOUNTER — HEALTH MAINTENANCE LETTER (OUTPATIENT)
Age: 43
End: 2023-07-02

## 2024-08-25 ENCOUNTER — HEALTH MAINTENANCE LETTER (OUTPATIENT)
Age: 44
End: 2024-08-25